# Patient Record
Sex: FEMALE | Race: ASIAN | NOT HISPANIC OR LATINO | URBAN - METROPOLITAN AREA
[De-identification: names, ages, dates, MRNs, and addresses within clinical notes are randomized per-mention and may not be internally consistent; named-entity substitution may affect disease eponyms.]

---

## 2020-01-17 ENCOUNTER — OFFICE VISIT - HEALTHEAST (OUTPATIENT)
Dept: FAMILY MEDICINE | Facility: CLINIC | Age: 45
End: 2020-01-17

## 2020-01-17 DIAGNOSIS — Z86.2 HISTORY OF ANEMIA: ICD-10-CM

## 2020-01-17 DIAGNOSIS — Z76.89 ENCOUNTER TO ESTABLISH CARE WITH NEW DOCTOR: ICD-10-CM

## 2020-01-17 DIAGNOSIS — R53.83 FATIGUE, UNSPECIFIED TYPE: ICD-10-CM

## 2020-01-17 DIAGNOSIS — R23.2 HOT FLASHES: ICD-10-CM

## 2020-01-17 LAB
ERYTHROCYTE [DISTWIDTH] IN BLOOD BY AUTOMATED COUNT: 15.5 % (ref 11–14.5)
HCT VFR BLD AUTO: 39.5 % (ref 35–47)
HGB BLD-MCNC: 12.4 G/DL (ref 12–16)
IRON SERPL-MCNC: 105 UG/DL (ref 42–175)
MCH RBC QN AUTO: 21.6 PG (ref 27–34)
MCHC RBC AUTO-ENTMCNC: 31.5 G/DL (ref 32–36)
MCV RBC AUTO: 69 FL (ref 80–100)
PLATELET # BLD AUTO: 318 THOU/UL (ref 140–440)
PMV BLD AUTO: 10.8 FL (ref 7–10)
RBC # BLD AUTO: 5.76 MILL/UL (ref 3.8–5.4)
TSH SERPL DL<=0.005 MIU/L-ACNC: 2.78 UIU/ML (ref 0.3–5)
WBC: 7.8 THOU/UL (ref 4–11)

## 2020-01-20 LAB — 25(OH)D3 SERPL-MCNC: 10.2 NG/ML (ref 30–80)

## 2020-01-21 ENCOUNTER — COMMUNICATION - HEALTHEAST (OUTPATIENT)
Dept: FAMILY MEDICINE | Facility: CLINIC | Age: 45
End: 2020-01-21

## 2020-01-21 ENCOUNTER — AMBULATORY - HEALTHEAST (OUTPATIENT)
Dept: FAMILY MEDICINE | Facility: CLINIC | Age: 45
End: 2020-01-21

## 2020-01-21 DIAGNOSIS — E55.9 VITAMIN D DEFICIENCY: ICD-10-CM

## 2020-03-18 ENCOUNTER — VIRTUAL VISIT (OUTPATIENT)
Dept: FAMILY MEDICINE | Facility: OTHER | Age: 45
End: 2020-03-18

## 2020-03-20 NOTE — PROGRESS NOTES
"Date: 2020 15:11:24  Clinician: Kaia Lund  Clinician NPI: [id number]  Patient: Donavan Pearson  Patient : 1975  Patient Address: [address] [place] [id number]  Patient Phone: [phone]  Visit Protocol: URI  Patient Summary:  Donavan is a 44 year old ( : 1975 ) female who initiated a Visit for COVID-19 (Coronavirus) evaluation and screening. When asked the question \"Please sign me up to receive news, health information and promotions. \", Donavan responded \"No\".    Donavan states her symptoms started gradually 3-6 days ago.   Her symptoms consist of a cough, malaise, and myalgia. She is experiencing mild difficulty breathing with activities but can speak normally in full sentences.   Symptom details   Cough: Donavan coughs a few times an hour and her cough is not more bothersome at night. Phlegm does not come into her throat when she coughs. She does not believe her cough is caused by post-nasal drip.    Donavan denies having wheezing, sore throat, nasal congestion, fever, ear pain, headache, rhinitis, enlarged lymph nodes, facial pain or pressure, chills, and teeth pain. She also denies taking antibiotic medication for the symptoms, having recent facial or sinus surgery in the past 60 days, and double sickening (worsening symptoms after initial improvement).   Precipitating events  She has not recently been exposed to someone with influenza. Donavan has not been in close contact with any high risk individuals.   Pertinent COVID-19 (Coronavirus) information  Donavan has not traveled internationally or to the areas where COVID-19 (Coronavirus) is widespread in the last 14 days before the start of her symptoms.   Donavan has not had a close contact with a laboratory-confirmed COVID-19 patient within 14 days of symptom onset. She also has not had a close contact with a suspected COVID-19 patient within 14 days of symptom onset.   Donavan is not a " healthcare worker and does not work in a healthcare facility.   Pertinent medical history  Donavan does not get yeast infections when she takes antibiotics.   Donavan does not need a return to work/school note.   Weight: 125 lbs   Donavan does not smoke or use smokeless tobacco.   She denies pregnancy and denies breastfeeding. She is currently menstruating.   Weight: 125 lbs    MEDICATIONS: No current medications, ALLERGIES: NKDA  Clinician Response:  Dear Abbyancelmodouglas,   Based on the information you have provided, you do have symptoms that are consistent with Coronavirus (COVID-19).  The coronavirus causes mild to severe respiratory illness with the most common symptoms including fever, cough and difficulty breathing. Unfortunately, many viruses cause similar symptoms and it can be difficult to distinguish between viruses, especially in mild cases, so we are presuming that anyone with cough or fever has coronavirus at this time.  Coronavirus/COVID-19 has reached the point of community spread in [place], meaning that we are finding the virus in people with no known exposure risk for stephanie the virus. Given the increasing commonness of coronavirus in the community we are no longer testing patients who are not critically ill.  If you are a health care worker, you should refer to your employee health office for instructions about returning to work.  For everyone else who has cough or fever, you should assume you are infected with coronavirus. Accordingly, you should self-quarantine for seven days from the first day your symptoms started OR 72 hours after your cough and fever completely resolve - WHICHEVER is LONGER. You should call if you find increasing shortness of breath, wheezing or sustained fever above 101.5. If you are significantly short of breath or experience chest pain you should call 911 or report to the nearest emergency department for urgent evaluation.    Isolate yourself at home.   Do Not  allow any visitors  Do Not go to work or school  Do Not go to [employer],  centers, shopping, or other public places.  Do Not shake hands.  Avoid close contact with others (hugging, kissing).   Protect Others:    Cover Your Mouth and Nose with a mask, disposable tissue or wash cloth to avoid spreading germs to others.  Wash your hands and face frequently with soap and water.   If you develop significant shortness of breath that prevents you from doing normal activities, please call 911 or proceed to the nearest emergency room and alert them immediately that you have been in self-isolation for possible coronavirus.   For more information about COVID19 and options for caring for yourself at home, please visit the CDC website at https://www.cdc.gov/coronavirus/2019-ncov/about/steps-when-sick.htmlFor more options for care at [facility], please visit our website at [email or web address]     Diagnosis: Cough  Diagnosis ICD: R05

## 2020-03-20 NOTE — PROGRESS NOTES
"Date: 2020 16:00:29  Clinician: Amie Monzon  Clinician NPI: [id number]  Patient: Donavan Collado  Patient : 1975  Patient Address: [address] [place] [id number]  Patient Phone: [phone]  Visit Protocol: URI  Patient Summary:  Donavan is a 44 year old ( : 1975 ) female who initiated a Visit for COVID-19 (Coronavirus) evaluation and screening. When asked the question \"Please sign me up to receive news, health information and promotions. \", Donavan responded \"Yes\".    Donavan states her symptoms started gradually 7-9 days ago. After her symptoms started, they improved and then got worse again.   Her symptoms consist of a cough, malaise, a headache, myalgia, and chills. She is experiencing mild difficulty breathing with activities but can speak normally in full sentences. Donavan also feels feverish.   Symptom details     Cough: Donavan coughs a few times an hour and her cough is not more bothersome at night. Phlegm does not come into her throat when she coughs. She does not believe her cough is caused by post-nasal drip.     Temperature: Her current temperature is 98.8 degrees Fahrenheit.     Headache: She states the headache is moderate (4-6 on a 10 point pain scale).      Donavan denies having wheezing, sore throat, nasal congestion, ear pain, rhinitis, enlarged lymph nodes, facial pain or pressure, and teeth pain. She also denies taking antibiotic medication for the symptoms, having a sinus infection within the past year, and having recent facial or sinus surgery in the past 60 days.   Precipitating events  She has not recently been exposed to someone with influenza. Donavan has not been in close contact with any high risk individuals.   Pertinent COVID-19 (Coronavirus) information  Donavan has not traveled internationally or to the areas where COVID-19 (Coronavirus) is widespread in the last 14 days before the start of her symptoms.   Donavan has not " had a close contact with a laboratory-confirmed COVID-19 patient within 14 days of symptom onset. She has had a close contact with a suspected COVID-19 patient within 14 days of symptom onset. Additional information about contact with COVID-19 (Coronavirus) patient as reported by the patient (free text): We visited friends in [place], [place] 3/11 where there was a confirmed case and this patient was admitted to their local hospital.   Donavan is not a healthcare worker and does not work in a healthcare facility.   Pertinent medical history  Donavan does not get yeast infections when she takes antibiotics.   Donavan does not need a return to work/school note.   Weight: 135 lbs   Donavan does not smoke or use smokeless tobacco.   She denies pregnancy and denies breastfeeding. She is currently menstruating.   Weight: 135 lbs    MEDICATIONS: No current medications, ALLERGIES: NKDA  Clinician Response:  Dear Maikollorenzo,   Based on the information you have provided, you do have symptoms that are consistent with Coronavirus (COVID-19).  The coronavirus causes mild to severe respiratory illness with the most common symptoms including fever, cough and difficulty breathing. Unfortunately, many viruses cause similar symptoms and it can be difficult to distinguish between viruses, especially in mild cases, so we are presuming that anyone with cough or fever has coronavirus at this time.  Coronavirus/COVID-19 has reached the point of community spread in [place], meaning that we are finding the virus in people with no known exposure risk for stephanie the virus. Given the increasing commonness of coronavirus in the community we are no longer testing patients who are not critically ill.  If you are a health care worker, you should refer to your employee health office for instructions about returning to work.  For everyone else who has cough or fever, you should assume you are infected with coronavirus. Accordingly, you  should self-quarantine for seven days from the first day your symptoms started OR 72 hours after your cough and fever completely resolve - WHICHEVER is LONGER. You should call if you find increasing shortness of breath, wheezing or sustained fever above 101.5. If you are significantly short of breath or experience chest pain you should call 911 or report to the nearest emergency department for urgent evaluation.    Isolate yourself at home.   Do Not allow any visitors  Do Not go to work or school  Do Not go to [employer],  centers, shopping, or other public places.  Do Not shake hands.  Avoid close contact with others (hugging, kissing).   Protect Others:    Cover Your Mouth and Nose with a mask, disposable tissue or wash cloth to avoid spreading germs to others.  Wash your hands and face frequently with soap and water.   If you develop significant shortness of breath that prevents you from doing normal activities, please call 911 or proceed to the nearest emergency room and alert them immediately that you have been in self-isolation for possible coronavirus.   For more information about COVID19 and options for caring for yourself at home, please visit the CDC website at https://www.cdc.gov/coronavirus/2019-ncov/about/steps-when-sick.htmlFor more options for care at [facility], please visit our website at [email or web address]     Diagnosis: Acute upper respiratory infection, unspecified  Diagnosis ICD: J06.9

## 2021-06-05 NOTE — PROGRESS NOTES
"Assessment/Plan:    1. Encounter to establish care with new doctor  Establishing care today, all past medical history reviewed and updated in EMR.  LEATHA signed today to receive records from [place].    2. Hot flashes  3. History of anemia  4. Fatigue, unspecified type  Discussed symptoms possibly related to premenopause/perimenopause or may be related to underlying medical disorder.  Will check blood work as below.  Discussed if blood work normal could consider medication to manage hormonal changes and associated symptoms such as OCPs or effexor.  Patient states she has never been on hormonal contraception and is not interested in starting that at this time but she would consider Effexor.  If blood work normal reasonable to start Effexor 37.5mg daily and increase to 75mg daily after 1-2 weeks if tolerating.  - Thyroid Cascade  - HM2(CBC w/o Differential)  - Iron  - Vitamin D, Total (25-Hydroxy)      Follow up: pending test results    Sury Rico MD  [facility]    Subjective:    Patient ID: Donavan Harper is a 44 y.o. female is here today for establish care with concern    \"Possible premenopause\"  -started 3 years ago after moving back to [place] from [place]  -started with hot flashes - worsening  -now also feeling more sad/dinero - PHQ2 score 0  -still having monthly periods - next should start in 1-2 days; sometimes 7 days off/irregular, lasts 7 days, heavy flow  -has had dizziness in the past  -hx low iron in the past  -has tried herbal options but not helping - wondering what else she can do  -feeling excessive fatigue lately      History reviewed. No pertinent past medical history.     Past Surgical History:   Procedure Laterality Date     BREAST LUMPECTOMY      benign, large      SECTION       No current outpatient medications on file prior to visit.     No current facility-administered medications on file prior to visit.      No Known Allergies  Social History "     Socioeconomic History     Marital status:      Spouse name: Not on file     Number of children: Not on file     Years of education: Not on file     Highest education level: Not on file   Occupational History     Not on file   Social Needs     Financial resource strain: Not on file     Food insecurity:     Worry: Not on file     Inability: Not on file     Transportation needs:     Medical: Not on file     Non-medical: Not on file   Tobacco Use     Smoking status: Never Smoker     Smokeless tobacco: Never Used   Substance and Sexual Activity     Alcohol use: Yes     Frequency: Monthly or less     Drinks per session: 1 or 2     Binge frequency: Never     Drug use: Never     Sexual activity: Yes     Partners: Male     Birth control/protection: None   Lifestyle     Physical activity:     Days per week: Not on file     Minutes per session: Not on file     Stress: Not on file   Relationships     Social connections:     Talks on phone: Not on file     Gets together: Not on file     Attends [employer] service: Not on file     Active member of club or organization: Not on file     Attends meetings of clubs or organizations: Not on file     Relationship status: Not on file     Intimate partner violence:     Fear of current or ex partner: Not on file     Emotionally abused: Not on file     Physically abused: Not on file     Forced sexual activity: Not on file   Other Topics Concern     Not on file   Social History Narrative     Not on file     Family History   Problem Relation Age of Onset     Diabetes Mother      Diabetes Father      Dementia Father      No Medical Problems Sister      No Medical Problems Brother      No Medical Problems Maternal Grandmother      No Medical Problems Maternal Grandfather         unknown history     No Medical Problems Paternal Grandmother      No Medical Problems Paternal Grandfather      No Medical Problems Brother      No Medical Problems Sister         OB History        4     Para   1    Term   1            AB   3    Living   1       SAB   3    TAB        Ectopic        Multiple        Live Births   1               Review of systems is as stated in HPI, and the remainder of the 10 system review is otherwise negative.    Objective:      There were no vitals taken for this visit.    General appearance: awake, NAD  HEENT: atraumatic, normocephalic, no scleral icterus or injection, ears and nose grossly normal, moist mucous membranes  Lungs: breathing comfortably on room air  Extremities: moving all extremities  Skin: no rashes or lesions  Neuro: alert, oriented x3, CNs grossly intact, no focal deficits appreciated  Psych: normal mood/affect/behavior, answering questions appropriately, linear thought process

## 2021-06-05 NOTE — TELEPHONE ENCOUNTER
return phone call and provide me a different phone number to reach Hilario, [phone]. If pt returns phone call please update her Pharmacy location . Thanks

## 2021-06-05 NOTE — TELEPHONE ENCOUNTER
----- Message from Crista Cordova CMA sent at 1/21/2020  4:08 PM CST -----    ----- Message -----  From: Sury Rico MD  Sent: 1/21/2020   4:06 PM CST  To: Sury Rico Care Team Pool    See other note for results - need pharmacy

## 2021-06-05 NOTE — TELEPHONE ENCOUNTER
Patient Returning Call  Reason for call:  Results   Information relayed to patient:    Notes recorded by Sury Rico MD on 1/21/2020 at 4:06 PM CST  See other note for results - need pharmacy  ------    Notes recorded by Sury Rico MD on 1/21/2020 at 4:03 PM CST  Please call pt with test results.    Hi Boun,    Your test results have returned.    Your thyroid function is normal.  Your iron level is normal.  Your blood counts are all normal, this includes white blood cells, red blood cells and platelets.  Your vitamin D level is very low at 10.8 (normal is 30-80).  Dr. Rico has sent replacement pills for you to take once per week for the next 8 weeks and then you should start over-the-counter vitamin D 2000 units daily.    Thanks,  Dr. Rico  Patient has additional questions:  Yes  If YES, what are your questions/concerns:  Patient would like the prescription to be sent to [facility] #[id number]  Okay to leave a detailed message?: No call back needed

## 2021-06-05 NOTE — PATIENT INSTRUCTIONS - HE
Blood work today: thyroid function, iron/blood counts, vitamin D    Typically get results in 1-2 days - if something is abnormal we will work to fix it  If tests all normal then would recommend starting venlafaxine for perimenopausal symptoms (hot flashes, mood/irritability, fatigue)